# Patient Record
Sex: MALE | URBAN - METROPOLITAN AREA
[De-identification: names, ages, dates, MRNs, and addresses within clinical notes are randomized per-mention and may not be internally consistent; named-entity substitution may affect disease eponyms.]

---

## 2023-02-21 ENCOUNTER — TRANSFERRED RECORDS (OUTPATIENT)
Dept: HEALTH INFORMATION MANAGEMENT | Facility: CLINIC | Age: 53
End: 2023-02-21

## 2023-02-21 LAB — HBA1C MFR BLD: 8.6 % (ref 4.8–5.6)

## 2023-03-29 ENCOUNTER — OFFICE VISIT (OUTPATIENT)
Dept: PHARMACY | Facility: PHYSICIAN GROUP | Age: 53
End: 2023-03-29
Payer: COMMERCIAL

## 2023-03-29 VITALS — WEIGHT: 167 LBS | DIASTOLIC BLOOD PRESSURE: 76 MMHG | SYSTOLIC BLOOD PRESSURE: 116 MMHG

## 2023-03-29 DIAGNOSIS — E78.5 HYPERLIPIDEMIA LDL GOAL <100: ICD-10-CM

## 2023-03-29 DIAGNOSIS — E10.9 TYPE 1 DIABETES MELLITUS MATURITY ONSET (H): Primary | ICD-10-CM

## 2023-03-29 DIAGNOSIS — G47.00 INSOMNIA, UNSPECIFIED TYPE: ICD-10-CM

## 2023-03-29 PROCEDURE — 99607 MTMS BY PHARM ADDL 15 MIN: CPT | Performed by: PHARMACIST

## 2023-03-29 PROCEDURE — 99605 MTMS BY PHARM NP 15 MIN: CPT | Performed by: PHARMACIST

## 2023-03-29 RX ORDER — LIRAGLUTIDE 6 MG/ML
1.2 INJECTION SUBCUTANEOUS DAILY
COMMUNITY

## 2023-03-29 RX ORDER — ATORVASTATIN CALCIUM 10 MG/1
10 TABLET, FILM COATED ORAL DAILY
COMMUNITY

## 2023-03-29 RX ORDER — PIOGLITAZONEHYDROCHLORIDE 30 MG/1
30 TABLET ORAL DAILY
COMMUNITY

## 2023-03-29 RX ORDER — METFORMIN HCL 500 MG
1000 TABLET, EXTENDED RELEASE 24 HR ORAL 2 TIMES DAILY
COMMUNITY

## 2023-03-29 RX ORDER — ZOLPIDEM TARTRATE 5 MG/1
5 TABLET ORAL
COMMUNITY

## 2023-03-29 NOTE — PROGRESS NOTES
Medication Therapy Management (MTM) Encounter    ASSESSMENT:                            Medication Adherence/Access: No issues identified    Type 1 Diabetes (residual c-peptide, honeymoon phase) : set up of Candice 2 today. Discussed pros/cons to different types of continuous glucose monitors and the limitations of the newer G7 and Candice 3 being covered, so starting with Candice 2 for simplicity and ease of use.     Hyperlipidemia: Stable.     Insomnia: Stable.    PLAN:                            1. Scan sensor every 8 hours or more, you can add notes as needed. Set up trial sensor today for 14 days and ordered ongoing sensors to pharmacy. Application set up on phone.     Follow-up: Return in about 4 weeks (around 4/26/2023) for MTM visit in clinic.    SUBJECTIVE/OBJECTIVE:                          Kalpesh Correa is a 52 year old male coming in for an initial visit. He was referred to me from Dr. Larios.      Reason for visit: discuss continuous glucose monitor discussion.    Allergies/ADRs: Reviewed in chart  Past Medical History: Reviewed in chart  Tobacco: He reports that he has never smoked. He has never used smokeless tobacco.  Alcohol: occasional    Medication Adherence/Access: no issues reported    Type 1 Diabetes (residual c-peptide, honeymoon phase)   Metformin ER 500mg - 2 twice daily  Jardiance 25mg daily  Pioglitazone 30mg daily  Victoza 1.2mg daily  No readings here today- discussing starting continuous glucose monitor.   Patient is not experiencing side effects.  Symptoms of low blood sugar? none  Symptoms of high blood sugar? fatigue  Diet/Exercise: goes to gym several days per week, trying to limit carbohydrates, but does like sweets.   Aspirin: Not taking.   Statin: Yes: atorvastatin   ACEi/ARB: No, no elevated blood pressures and microalbumin <30mg/g in Feb 2023.     AUDELIA antibody positive (3/16/22)  C-peptide = 1.4 on 3/16/22 with 194 glucose.       Hyperlipidemia:   atorvastatin 10mg daily  Patient  reports no significant myalgias or other side effects.  Date: 2/21/23  Total Cholesterol: 192mg/dl  Triglycerides: 124mg/dl  HDL: 100mg/dl  LDL: 71mg/dl    Insomnia:   Has zolpidem as needed, but not regularly taking it. Only if having a lot of trouble sleeping.   No side effects noted.     Today's Vitals: /76   Wt 167 lb (75.8 kg)   ----------------    I spent 60 minutes with this patient today. All changes were made via collaborative practice agreement with Sudheer Posada MD. A copy of the visit note was provided to the patient's provider(s).    A summary of these recommendations was given to the patient.    Tanya Dugan, Pharm.D, [facility]  Medication Therapy Management Pharmacist  [phone]       Medication Therapy Recommendations  Type 1 diabetes mellitus maturity onset (H)    Current Medication: metFORMIN (GLUCOPHAGE XR) 500 MG 24 hr tablet   Rationale: Medication requires monitoring - Needs additional monitoring - Effectiveness   Recommendation: Self-Monitoring - FreeStyle Candice 2 Sensor Misc - start cgm   Status: Accepted per CPA

## 2023-03-29 NOTE — PATIENT INSTRUCTIONS
"Recommendations from today's MTM visit:                                                       1. Scan sensor every 8 hours or more, you can add notes as needed.     Follow-up: Return in about 4 weeks (around 4/26/2023) for MTM visit in clinic.    It was great speaking with you today.  I value your experience and would be very thankful for your time in providing feedback in our clinic survey. In the next few days, you may receive an email or text message from [facility] [employer] with a link to a survey related to your  clinical pharmacist.\"     My Clinical Pharmacist's contact information:                                                      Please feel free to contact me with any questions or concerns you have.      Tanya Dugan, Pharm.D, [facility]  Medication Therapy Management Pharmacist  [phone]      "

## 2023-09-21 NOTE — PROGRESS NOTES
Medication Therapy Management (MTM) Encounter    ASSESSMENT:                            Medication Adherence/Access: No issues identified    Type 1 Diabetes (residual c-peptide, honeymoon phase):  Patient is not meeting A1c goal of < 7%.  Patient would benefit from repeat A1c today - ordered after visit.  Depending on A1c will make med changes as needed. Pioglitazone could be optimized further. Due to patient not wanting to lose any additional weight, would not recommend increasing Victoza any further. Discussed that after maxing out pioglitazone, next option would be adding on insulin. Patient would rather work on lifestyle changes, going back to the gym, prior to starting insulin.     Hyperlipidemia: Stable.      Insomnia: Patient is requesting a refill of Ambien, discussed that since this is a controlled substance I cannot refill it, but I will notify his PCP.    PLAN:                            Repeat your A1c today after our visit  I will send a message to Dr. Posada about a refill of your Ambien    Follow-up: We will discuss follow up depending on your A1c.    SUBJECTIVE/OBJECTIVE:                          Kalpesh Correa is a 53 year old male coming in for a follow-up visit from 3/29/23 with Janell ValenzuelaD.       Reason for visit: med review.    Allergies/ADRs: Reviewed in chart  Past Medical History: Reviewed in chart  Tobacco: He reports that he has never smoked. He has never used smokeless tobacco.  Alcohol: Less than 1 beverages / week    Medication Adherence/Access: no issues reported    Diabetes   Type 1 Diabetes (residual c-peptide, honeymoon phase) :    Metformin ER 1000mg - 2 twice daily  Jardiance 25mg daily - does report frequent urination  Pioglitazone 30mg daily  Victoza 1.2 mg daily - reports maybe some occasional nausea and reduced appetite. Stopped increasing Victoza because he doesn't need to lose anymore weight  No longer using the slade. Reports that he wore it for 3 months. Hasn't  been checking sugars at all. Felt like they were always the same and he was never having any lows, so didn't feel it was necessary to keep checking.  Patient was told me is a mix of type 1 and type 2 and they are trying to manage as type 2 as long as possible, but knows he will have to switch to insulin eventually in the future.  Symptoms of low blood sugar? none  Symptoms of high blood sugar? fatigue, thirst, specifically in the night, which he has historically gone higher over night  Diet/Exercise: hasn't been going to the gym - reports that have had a lot of family issues recently (MIL passed away) and knows this is why sugars have been higher, trying to limit carbohydrates, but does like sweets.   Aspirin: Not taking.   Statin: Yes: atorvastatin   ACEi/ARB: No, no elevated blood pressures and microalbumin <30mg/g in Feb 2023.      AUDELIA antibody positive (3/16/22)  C-peptide = 1.4 on 3/16/22 with 194 glucose.            Hyperlipidemia   atorvastatin 10mg daily  Patient reports no significant myalgias or other side effects.         Insomnia:   Has zolpidem as needed, but not regularly taking it. Only if having a lot of trouble sleeping.   Reports that he uses this about 3 times a month.  No side effects noted.     Today's Vitals: /82   Pulse 64   Wt 163 lb 6.1 oz (74.1 kg)   ----------------      I spent 30 minutes with this patient today. All changes were made via collaborative practice agreement with Sudheer Posada MD. A copy of the visit note was provided to the patient's provider(s).    A summary of these recommendations was given to the patient.    Belkis Hadley, PharmD  Medication Therapy Management Pharmacist  Voicemail: [phone]         Medication Therapy Recommendations  Type 1 diabetes mellitus maturity onset (H)    Current Medication: metFORMIN (GLUCOPHAGE XR) 500 MG 24 hr tablet   Rationale: Medication requires monitoring - Needs additional monitoring   Recommendation: Order Lab    Status: Accepted per CPA

## 2023-09-22 ENCOUNTER — OFFICE VISIT (OUTPATIENT)
Dept: PHARMACY | Facility: PHYSICIAN GROUP | Age: 53
End: 2023-09-22
Payer: COMMERCIAL

## 2023-09-22 VITALS — WEIGHT: 163.38 LBS | HEART RATE: 64 BPM | SYSTOLIC BLOOD PRESSURE: 126 MMHG | DIASTOLIC BLOOD PRESSURE: 82 MMHG

## 2023-09-22 DIAGNOSIS — G47.00 INSOMNIA, UNSPECIFIED TYPE: ICD-10-CM

## 2023-09-22 DIAGNOSIS — E10.9 TYPE 1 DIABETES MELLITUS MATURITY ONSET (H): Primary | ICD-10-CM

## 2023-09-22 DIAGNOSIS — E78.5 HYPERLIPIDEMIA LDL GOAL <100: ICD-10-CM

## 2023-09-22 PROCEDURE — 99607 MTMS BY PHARM ADDL 15 MIN: CPT | Performed by: PHARMACIST

## 2023-09-22 PROCEDURE — 99606 MTMS BY PHARM EST 15 MIN: CPT | Performed by: PHARMACIST

## 2024-07-15 ENCOUNTER — TRANSFERRED RECORDS (OUTPATIENT)
Dept: HEALTH INFORMATION MANAGEMENT | Facility: CLINIC | Age: 54
End: 2024-07-15

## 2024-07-15 LAB — HBA1C MFR BLD: 7.6 % (ref 4.8–5.6)
